# Patient Record
Sex: FEMALE | Race: WHITE | NOT HISPANIC OR LATINO | Employment: OTHER | URBAN - METROPOLITAN AREA
[De-identification: names, ages, dates, MRNs, and addresses within clinical notes are randomized per-mention and may not be internally consistent; named-entity substitution may affect disease eponyms.]

---

## 2023-01-16 ENCOUNTER — HOSPITAL ENCOUNTER (EMERGENCY)
Facility: OTHER | Age: 78
Discharge: HOME | End: 2023-01-16
Attending: EMERGENCY MEDICINE

## 2023-01-16 VITALS
DIASTOLIC BLOOD PRESSURE: 66 MMHG | BODY MASS INDEX: 23.66 KG/M2 | HEART RATE: 64 BPM | HEIGHT: 65 IN | WEIGHT: 142 LBS | SYSTOLIC BLOOD PRESSURE: 148 MMHG | RESPIRATION RATE: 18 BRPM | TEMPERATURE: 98 F | OXYGEN SATURATION: 99 %

## 2023-01-16 DIAGNOSIS — R10.84 GENERALIZED ABDOMINAL PAIN: Primary | ICD-10-CM

## 2023-01-16 DIAGNOSIS — R11.2 NAUSEA AND VOMITING, UNSPECIFIED VOMITING TYPE: ICD-10-CM

## 2023-01-16 LAB
ALBUMIN SERPL BCP-MCNC: 3 G/DL (ref 3.5–5.2)
ALP SERPL-CCNC: 54 U/L (ref 55–135)
ALT SERPL W/O P-5'-P-CCNC: 16 U/L (ref 10–44)
ANION GAP SERPL CALC-SCNC: 9 MMOL/L (ref 8–16)
AST SERPL-CCNC: 21 U/L (ref 10–40)
BASOPHILS # BLD AUTO: 0.01 K/UL (ref 0–0.2)
BASOPHILS NFR BLD: 0.1 % (ref 0–1.9)
BILIRUB SERPL-MCNC: 0.8 MG/DL (ref 0.1–1)
BUN SERPL-MCNC: 9 MG/DL (ref 8–23)
CALCIUM SERPL-MCNC: 8.5 MG/DL (ref 8.7–10.5)
CHLORIDE SERPL-SCNC: 104 MMOL/L (ref 95–110)
CO2 SERPL-SCNC: 22 MMOL/L (ref 23–29)
CREAT SERPL-MCNC: 0.7 MG/DL (ref 0.5–1.4)
DIFFERENTIAL METHOD: ABNORMAL
EOSINOPHIL # BLD AUTO: 0 K/UL (ref 0–0.5)
EOSINOPHIL NFR BLD: 0 % (ref 0–8)
ERYTHROCYTE [DISTWIDTH] IN BLOOD BY AUTOMATED COUNT: 15.6 % (ref 11.5–14.5)
EST. GFR  (NO RACE VARIABLE): >60 ML/MIN/1.73 M^2
GLUCOSE SERPL-MCNC: 146 MG/DL (ref 70–110)
HCT VFR BLD AUTO: 35 % (ref 37–48.5)
HGB BLD-MCNC: 11.8 G/DL (ref 12–16)
IMM GRANULOCYTES # BLD AUTO: 0.03 K/UL (ref 0–0.04)
IMM GRANULOCYTES NFR BLD AUTO: 0.4 % (ref 0–0.5)
LIPASE SERPL-CCNC: 27 U/L (ref 4–60)
LYMPHOCYTES # BLD AUTO: 0.8 K/UL (ref 1–4.8)
LYMPHOCYTES NFR BLD: 9.7 % (ref 18–48)
MCH RBC QN AUTO: 29.9 PG (ref 27–31)
MCHC RBC AUTO-ENTMCNC: 33.7 G/DL (ref 32–36)
MCV RBC AUTO: 89 FL (ref 82–98)
MONOCYTES # BLD AUTO: 0.3 K/UL (ref 0.3–1)
MONOCYTES NFR BLD: 3.9 % (ref 4–15)
NEUTROPHILS # BLD AUTO: 6.9 K/UL (ref 1.8–7.7)
NEUTROPHILS NFR BLD: 85.9 % (ref 38–73)
NRBC BLD-RTO: 0 /100 WBC
PLATELET # BLD AUTO: 256 K/UL (ref 150–450)
PMV BLD AUTO: 10.6 FL (ref 9.2–12.9)
POTASSIUM SERPL-SCNC: 4.2 MMOL/L (ref 3.5–5.1)
PROT SERPL-MCNC: 5.7 G/DL (ref 6–8.4)
RBC # BLD AUTO: 3.95 M/UL (ref 4–5.4)
SODIUM SERPL-SCNC: 135 MMOL/L (ref 136–145)
WBC # BLD AUTO: 8.01 K/UL (ref 3.9–12.7)

## 2023-01-16 PROCEDURE — 80053 COMPREHEN METABOLIC PANEL: CPT | Performed by: PHYSICIAN ASSISTANT

## 2023-01-16 PROCEDURE — 85025 COMPLETE CBC W/AUTO DIFF WBC: CPT | Performed by: PHYSICIAN ASSISTANT

## 2023-01-16 PROCEDURE — 83690 ASSAY OF LIPASE: CPT | Performed by: PHYSICIAN ASSISTANT

## 2023-01-16 PROCEDURE — 25000003 PHARM REV CODE 250: Performed by: PHYSICIAN ASSISTANT

## 2023-01-16 PROCEDURE — 96375 TX/PRO/DX INJ NEW DRUG ADDON: CPT

## 2023-01-16 PROCEDURE — 96365 THER/PROPH/DIAG IV INF INIT: CPT

## 2023-01-16 PROCEDURE — 99284 EMERGENCY DEPT VISIT MOD MDM: CPT | Mod: 25

## 2023-01-16 PROCEDURE — 63600175 PHARM REV CODE 636 W HCPCS: Performed by: PHYSICIAN ASSISTANT

## 2023-01-16 RX ORDER — ONDANSETRON 2 MG/ML
4 INJECTION INTRAMUSCULAR; INTRAVENOUS
Status: COMPLETED | OUTPATIENT
Start: 2023-01-16 | End: 2023-01-16

## 2023-01-16 RX ORDER — PROMETHAZINE HYDROCHLORIDE 25 MG/1
12.5 TABLET ORAL EVERY 6 HOURS PRN
Qty: 10 TABLET | Refills: 0 | Status: SHIPPED | OUTPATIENT
Start: 2023-01-16

## 2023-01-16 RX ADMIN — PROMETHAZINE HYDROCHLORIDE 12.5 MG: 25 INJECTION INTRAMUSCULAR; INTRAVENOUS at 09:01

## 2023-01-16 RX ADMIN — ONDANSETRON 4 MG: 2 INJECTION INTRAMUSCULAR; INTRAVENOUS at 09:01

## 2023-01-17 NOTE — ED TRIAGE NOTES
Pt presents to ED via EMS c/o fever and chills,N/V since 0300 this a.m., took OTC Pepto bismol-not effective. Pt denies diarrhea. Pt. reports returned from cruise [place] this past Sunday. Pt is AAOX4. Pt is in no acute distress

## 2023-01-17 NOTE — ED NOTES
PO CHALLENGE: Pt given 4 ounces of water to consume po. Pt encouraged to complete fluids as tolerated. PO challenge in progress. Will continue to monitor.

## 2023-01-17 NOTE — ED NOTES
NAUSEA:   Pt with emesis bag up to her mouth. Pt began dry heaving. PO challenge stopped. Lamin Linares PA-C notified. Awaiting new orders.

## 2023-01-17 NOTE — ED PROVIDER NOTES
"Source of History:  Patient     Chief complaint:  Abdominal Pain (Generalized weakness, abdominal pain, nausea, and vomiting for today. Pt recently back from a cruise in [place]. Pt in no acute distress at this time with chest noted to equal rise and fall. Pt AAOx4. 300mL of NS and 4mg of Zofran given PTA by EMS.)      HPI:  Maria M Hagan is a 77 y.o. female with hypothyroidism and hypertension who is presenting to the emergency department with abdominal pain, nausea and vomiting.  Symptoms began this morning.  She reports multiple episodes of nonbloody emesis.  Reports increased amount of bowel movements but no loose or bloody stools.  Patient is currently traveling from [place].  She just returned from a cruise to [place] yesterday.  She denies fever, chills, cough, myalgias.  She denies similar sick contacts. No dysuria   This is the extent to the patients complaints today here in the emergency department.    ROS: As per HPI     Review of patient's allergies indicates:  No Known Allergies    PMH:  As per HPI and below:  No past medical history on file.  No past surgical history on file.         Physical Exam:    /66   Pulse 72   Temp 97.9 °F (36.6 °C) (Oral)   Resp 18   Ht 5' 5" (1.651 m)   Wt 64.4 kg (142 lb)   SpO2 100%   BMI 23.63 kg/m²   Nurse's notes vitals reviewed  General: Patient alert and oriented well-developed well-nourished.  No distress.  Nontoxic appearing.  HENT: Oropharynx nonerythematous, oral mucosa dry. Normocephalic, atraumatic.   Eyes: Extraocular muscles are intact, normal conjunctiva, normal sclera  Cardiovascular: Regular rate and rhythm no murmurs gallops or rubs  Respiratory: Clear to auscultation bilaterally without wheezing rhonchi or rales  GI:  Soft.  Normal bowel sounds.  Nontender  Musculoskeletal:  Normal range of motion.  No obvious deformities, no edema, normal cap refill  Skin: Warm and dry no rashes or lesions, no ecchymosis. Poor skin turgor  Neuro: alert " and oriented x3  Psych: Normal mood and affect     Labs that have been ordered have been independently reviewed and interpreted by myself.    I decided to obtain the patient's medical records.    MDM:    77 y.o. female with hypothyroidism hypertension who is presenting to the emergency department with nausea, vomiting and diarrhea after recent travel to [place].  Patient is afebrile, nontoxic appearing and hemodynamically stable.  No history of fever or bloody stools or raw food ingestion.  Doubt bacterial etiology.  Differential includes viral gastritis, pancreatitis, GERD, enteritis  ED Course as of 01/17/23 2003 Mon Jan 16, 2023   2231 CBC without leukocytosis, mild anemia noted, no other significant abnormalities.  Chemistry with mild hyponatremia [AG]   2319 After 2nd antiemetic, patient is feeling better.  She passed p.o. challenge.  Patient advised on supportive care, strict return precautions to the ED. [AG]      ED Course User Index  [AG] Lamin Linares PA-C             Diagnostic Impression:    1. Generalized abdominal pain    2. Nausea and vomiting, unspecified vomiting type                  Lamin Linares PA-C  01/17/23 2003

## 2023-01-17 NOTE — ED NOTES
PO CHALLENGE: Pt given 4 ounces of water to consume po. Pt states she's feeling better.  Pt encouraged to complete fluids as tolerated. PO challenge in progress. Will continue to monitor.